# Patient Record
Sex: FEMALE | Race: WHITE | Employment: FULL TIME | URBAN - METROPOLITAN AREA
[De-identification: names, ages, dates, MRNs, and addresses within clinical notes are randomized per-mention and may not be internally consistent; named-entity substitution may affect disease eponyms.]

---

## 2018-05-07 ENCOUNTER — OFFICE VISIT (OUTPATIENT)
Dept: OBGYN CLINIC | Facility: CLINIC | Age: 28
End: 2018-05-07

## 2018-05-07 VITALS
WEIGHT: 155 LBS | HEART RATE: 62 BPM | SYSTOLIC BLOOD PRESSURE: 112 MMHG | TEMPERATURE: 98 F | BODY MASS INDEX: 25.83 KG/M2 | HEIGHT: 65 IN | DIASTOLIC BLOOD PRESSURE: 72 MMHG | RESPIRATION RATE: 12 BRPM

## 2018-05-07 DIAGNOSIS — Z30.431 SURVEILLANCE OF (INTRAUTERINE) CONTRACEPTIVE DEVICE: ICD-10-CM

## 2018-05-07 DIAGNOSIS — N60.02 BREAST CYST, LEFT: ICD-10-CM

## 2018-05-07 DIAGNOSIS — Z01.419 WELL WOMAN EXAM WITH ROUTINE GYNECOLOGICAL EXAM: Primary | ICD-10-CM

## 2018-05-07 DIAGNOSIS — N64.4 BREAST PAIN, LEFT: ICD-10-CM

## 2018-05-07 DIAGNOSIS — Z12.39 ENCOUNTER FOR SCREENING BREAST EXAMINATION: ICD-10-CM

## 2018-05-07 DIAGNOSIS — Z11.3 SCREEN FOR SEXUALLY TRANSMITTED DISEASES: ICD-10-CM

## 2018-05-07 DIAGNOSIS — Z12.4 SCREENING FOR MALIGNANT NEOPLASM OF CERVIX: ICD-10-CM

## 2018-05-07 PROCEDURE — 87591 N.GONORRHOEAE DNA AMP PROB: CPT | Performed by: NURSE PRACTITIONER

## 2018-05-07 PROCEDURE — 87625 HPV TYPES 16 & 18 ONLY: CPT | Performed by: NURSE PRACTITIONER

## 2018-05-07 PROCEDURE — 99385 PREV VISIT NEW AGE 18-39: CPT | Performed by: NURSE PRACTITIONER

## 2018-05-07 PROCEDURE — 87491 CHLMYD TRACH DNA AMP PROBE: CPT | Performed by: NURSE PRACTITIONER

## 2018-05-07 PROCEDURE — 87624 HPV HI-RISK TYP POOLED RSLT: CPT | Performed by: NURSE PRACTITIONER

## 2018-05-07 NOTE — PROGRESS NOTES
HPI:   Josiane Walker is a 32year old  who presents for an annual gynecological exam.   Has had Amy in place for past 2 years. Has no bleeding or issues with IUD. Denies vaginal concerns. Has pain in left breast for past 1-2 weeks.  Pt can feel GENERAL:  Well-appearing, no apparent distress, well nourished, well developed  SKIN: Normal, no rashes, no acne, no hirsutism, no ulcers  HEENT: Atraumatic, normocephalic  NECK: No masses, symmetric  THYROID: No masses, no thyromegaly  BREASTS: Normal i encouraged pt to have yearly annual examinations with her PCP for management of general medical problems. Pt voiced understanding of all instructions; all questions answered; no barriers to learning.       ASSESSMENT AND PLAN:   Ezra Pérez is a 32

## 2018-05-07 NOTE — PROGRESS NOTES
Patient felt a lump on her left breast last Thursday. Never had a mammo.  Last pap 2 years ago- normal.  Patient states she has been tired for approx 2-3 weeks

## 2018-05-14 ENCOUNTER — HOSPITAL ENCOUNTER (OUTPATIENT)
Dept: MAMMOGRAPHY | Age: 28
Discharge: HOME | End: 2018-05-14
Attending: NURSE PRACTITIONER
Payer: COMMERCIAL

## 2018-05-14 ENCOUNTER — HOSPITAL ENCOUNTER (OUTPATIENT)
Dept: ULTRASOUND IMAGING | Age: 28
Discharge: HOME | End: 2018-05-14
Attending: NURSE PRACTITIONER
Payer: COMMERCIAL

## 2018-05-14 DIAGNOSIS — N60.02 BREAST CYST, LEFT: ICD-10-CM

## 2018-05-14 DIAGNOSIS — N64.4 BREAST PAIN, LEFT: ICD-10-CM

## 2018-05-14 PROCEDURE — 76642 ULTRASOUND BREAST LIMITED: CPT | Performed by: NURSE PRACTITIONER

## 2018-06-08 ENCOUNTER — OFFICE VISIT (OUTPATIENT)
Dept: OBGYN CLINIC | Facility: CLINIC | Age: 28
End: 2018-06-08

## 2018-06-08 VITALS
DIASTOLIC BLOOD PRESSURE: 70 MMHG | SYSTOLIC BLOOD PRESSURE: 110 MMHG | RESPIRATION RATE: 12 BRPM | TEMPERATURE: 98 F | HEIGHT: 65 IN | WEIGHT: 154 LBS | BODY MASS INDEX: 25.66 KG/M2 | HEART RATE: 68 BPM

## 2018-06-08 DIAGNOSIS — R87.810 CERVICAL HIGH RISK HUMAN PAPILLOMAVIRUS (HPV) DNA TEST POSITIVE: Primary | ICD-10-CM

## 2018-06-08 PROCEDURE — 88305 TISSUE EXAM BY PATHOLOGIST: CPT | Performed by: OBSTETRICS & GYNECOLOGY

## 2018-06-08 PROCEDURE — 57454 BX/CURETT OF CERVIX W/SCOPE: CPT | Performed by: OBSTETRICS & GYNECOLOGY

## 2018-06-08 NOTE — PROGRESS NOTES
COLPOSCOPY:     25year old woman, , with an LMP of No LMP recorded (approximate). Patient is not currently having periods (Reason: IUD - Intrauterine Device). presents for colposcopy. The patient is not pregnant.  nat in with string visible  The discussed, as well as dysplasia. Patient was counseled on abstention from tobacco use. The need for close follow-up to help reduce the progression to cervical cancer was stressed to the patient.       Discussed that a brown, yellow or light red discharge

## 2018-06-19 ENCOUNTER — TELEPHONE (OUTPATIENT)
Dept: OBGYN CLINIC | Facility: CLINIC | Age: 28
End: 2018-06-19

## 2018-06-19 NOTE — TELEPHONE ENCOUNTER
Pt informed of results. Told dr Lauern Covington is out of the office, but we will call with her recommendations (re-pap in 6 vs 12 months) tomorrow after she reviews the results. Pt jacoby.

## 2019-05-29 ENCOUNTER — TELEPHONE (OUTPATIENT)
Dept: OBGYN CLINIC | Facility: CLINIC | Age: 29
End: 2019-05-29

## 2019-05-29 NOTE — TELEPHONE ENCOUNTER
PC with patient. Recommended taking Ibuprofen 400 mg prior to coming in for IUD appointment. Understands.

## 2019-06-18 ENCOUNTER — OFFICE VISIT (OUTPATIENT)
Dept: OBGYN CLINIC | Facility: CLINIC | Age: 29
End: 2019-06-18
Payer: COMMERCIAL

## 2019-06-18 ENCOUNTER — TELEPHONE (OUTPATIENT)
Dept: OBGYN CLINIC | Facility: CLINIC | Age: 29
End: 2019-06-18

## 2019-06-18 VITALS
BODY MASS INDEX: 24.66 KG/M2 | SYSTOLIC BLOOD PRESSURE: 118 MMHG | DIASTOLIC BLOOD PRESSURE: 72 MMHG | WEIGHT: 148 LBS | HEIGHT: 65 IN

## 2019-06-18 DIAGNOSIS — Z30.433 ENCOUNTER FOR REMOVAL AND REINSERTION OF INTRAUTERINE CONTRACEPTIVE DEVICE: Primary | ICD-10-CM

## 2019-06-18 PROCEDURE — 58301 REMOVE INTRAUTERINE DEVICE: CPT | Performed by: NURSE PRACTITIONER

## 2019-06-18 PROCEDURE — 81025 URINE PREGNANCY TEST: CPT | Performed by: NURSE PRACTITIONER

## 2019-06-18 PROCEDURE — 58300 INSERT INTRAUTERINE DEVICE: CPT | Performed by: NURSE PRACTITIONER

## 2019-06-18 RX ORDER — MULTIVITAMIN
TABLET ORAL
COMMUNITY

## 2019-06-18 NOTE — PROGRESS NOTES
Patient is here for placement of removal of Amy IUD and placement of [address] IUD. I discussed with the patient risks, benefits, and alternatives of IUD placement.   We discussed risk of failure rate and pregnancy, including ectopic pregnancy, pelvic in to protocol. IUD string cut to 3 cm from ext os. Pt tolerated procedure well. Lot #[id number] exp 01/2021    Assessment and Plan:    IUD placement:  The patient is instructed to RTC in 4 weeks for IUD check. Reviewed expectations with patient.  To call the

## 2020-10-28 ENCOUNTER — TELEPHONE (OUTPATIENT)
Dept: SURGERY | Facility: CLINIC | Age: 30
End: 2020-10-28

## 2020-10-28 NOTE — TELEPHONE ENCOUNTER
Calling alternative number- pt's boyfriend to try to reach the pt to set up an appt for Friday. Informed him that I have not been able to reach her via phone, and if he is able to reach her sooner, then let her know I have LVM and to have her call back.

## 2020-10-28 NOTE — TELEPHONE ENCOUNTER
Attempted to call pt back regarding an appt with Dr. Lalo Flores. No answer. LVM after verifying verbal release. Informed pt that I have received her records and we can see her on Friday @ 1:30.   Provided office number to call back or with additional question

## 2020-10-30 ENCOUNTER — OFFICE VISIT (OUTPATIENT)
Dept: SURGERY | Facility: CLINIC | Age: 30
End: 2020-10-30
Payer: COMMERCIAL

## 2020-10-30 VITALS
DIASTOLIC BLOOD PRESSURE: 84 MMHG | BODY MASS INDEX: 23.32 KG/M2 | HEART RATE: 86 BPM | OXYGEN SATURATION: 99 % | RESPIRATION RATE: 16 BRPM | SYSTOLIC BLOOD PRESSURE: 124 MMHG | HEIGHT: 65 IN | WEIGHT: 140 LBS

## 2020-10-30 DIAGNOSIS — N60.81 SEBACEOUS CYST OF SKIN OF RIGHT BREAST: Primary | ICD-10-CM

## 2020-10-30 DIAGNOSIS — R59.0 AXILLARY ADENOPATHY: ICD-10-CM

## 2020-10-30 PROCEDURE — 99244 OFF/OP CNSLTJ NEW/EST MOD 40: CPT | Performed by: SURGERY

## 2020-10-30 PROCEDURE — 76642 ULTRASOUND BREAST LIMITED: CPT | Performed by: SURGERY

## 2020-10-30 PROCEDURE — 99072 ADDL SUPL MATRL&STAF TM PHE: CPT | Performed by: SURGERY

## 2020-10-30 PROCEDURE — 3074F SYST BP LT 130 MM HG: CPT | Performed by: SURGERY

## 2020-10-30 PROCEDURE — 3079F DIAST BP 80-89 MM HG: CPT | Performed by: SURGERY

## 2020-10-30 PROCEDURE — 3008F BODY MASS INDEX DOCD: CPT | Performed by: SURGERY

## 2020-11-03 NOTE — PROGRESS NOTES
Breast Surgery New Patient Consultation    This is the first visit for this 26year old woman, referred by Dr. Judy Ness, who presents for evaluation of right breast infection.     History of Present Illness:   Ms. Claudette Fisher is a 26year old woman who pr Relation Age of Onset   • Cancer Father 61        skin   • Colon Cancer Maternal Uncle 72       She is not of Ashkenazi [place] ancestry.     Social History:    Alcohol use Yes 7.0 standard drinks/week 7 Glasses of wine per week         Smoking status: Never lesions, dry skin, change in skin color or change in moles. Hematologic/Lymphatic:  The patient denies easily bruising or bleeding or persistent swollen glands or lymph nodes.      Musculoskeletal:  The patient denies muscle aches/pain, joint pain, stif breast with no surrounding cellulitis, underlying fluctuance or other concerns. There is no skin dimpling with movement of the pectoralis. There is no nipple retraction. No nipple discharge can be elicited. The parenchyma is mildly nodular.  There are no d ultrasound for the palpable left axillary lymph node.     Targeted left axillary lymph node   Focusing revealed left axilla at the area of palpable concern demonstrated a normal-appearing lymph node that was enlarged with a maximal dimension of 1.8 x 0.9 cm

## 2020-12-01 ENCOUNTER — TELEPHONE (OUTPATIENT)
Dept: SURGERY | Facility: CLINIC | Age: 30
End: 2020-12-01

## 2020-12-01 DIAGNOSIS — N60.81 SEBACEOUS CYST OF SKIN OF RIGHT BREAST: Primary | ICD-10-CM

## 2020-12-01 RX ORDER — LEVOFLOXACIN 750 MG/1
750 TABLET ORAL DAILY
Qty: 7 TABLET | Refills: 0 | Status: SHIPPED | OUTPATIENT
Start: 2020-12-01 | End: 2020-12-08 | Stop reason: ALTCHOICE

## 2020-12-01 NOTE — TELEPHONE ENCOUNTER
----- Message from Leslee Tolbert sent at 11/27/2020  9:44 PM CST -----  Regarding: Visit Follow-up Question  Contact: [phone]  Julio César - I saw Dr Norma Gee a few weeks back because my primary doctor wanted me to follow up with a hard bump on my breast.

## 2020-12-01 NOTE — TELEPHONE ENCOUNTER
Returned pt phone call regarding a breast lump that has returned. Pt states it is the same thing that she saw Dr. Greta Martínez for last time. Pt reports that it is on \"the right outer, lower portion where the bra strap would hit. ... Radha Maldonado and it looks like a pimple

## 2020-12-01 NOTE — TELEPHONE ENCOUNTER
Returned pt phone call regarding \"breast lump that has returned\"  Patient denies fevers, warmth, or drainage.    Patient reports \"it feels like it did 3 days prior to last time when it was really painful\"  Informed pt that I would be sending in an antib

## 2020-12-08 ENCOUNTER — OFFICE VISIT (OUTPATIENT)
Dept: SURGERY | Facility: CLINIC | Age: 30
End: 2020-12-08
Payer: COMMERCIAL

## 2020-12-08 VITALS
SYSTOLIC BLOOD PRESSURE: 101 MMHG | RESPIRATION RATE: 16 BRPM | OXYGEN SATURATION: 98 % | DIASTOLIC BLOOD PRESSURE: 51 MMHG | HEART RATE: 79 BPM

## 2020-12-08 DIAGNOSIS — N60.81 SEBACEOUS CYST OF SKIN OF RIGHT BREAST: Primary | ICD-10-CM

## 2020-12-08 PROCEDURE — 3078F DIAST BP <80 MM HG: CPT | Performed by: SURGERY

## 2020-12-08 PROCEDURE — 3074F SYST BP LT 130 MM HG: CPT | Performed by: SURGERY

## 2020-12-08 PROCEDURE — 99214 OFFICE O/P EST MOD 30 MIN: CPT | Performed by: SURGERY

## 2020-12-08 RX ORDER — DICYCLOMINE HYDROCHLORIDE 10 MG/1
CAPSULE ORAL
COMMUNITY
Start: 2020-12-04

## 2020-12-08 NOTE — PROGRESS NOTES
Breast Surgery Surveillance Visit    History of Present Illness:   Ms. Khadra Cordova is a 26year old woman who presents with history of an abscess of the right breast that developed in October 2020.   She does report that she has had a history of swelli of Onset   • Cancer Father 61        skin   • Colon Cancer Maternal Uncle 72       She is not of Ashkenazi [place] ancestry.     Social History:    Alcohol use Yes 7.0 standard drinks/week 7 Glasses of wine per week         Smoking status: Never Smoker   Smo skin, change in skin color or change in moles. Hematologic/Lymphatic:  The patient denies easily bruising or bleeding or persistent swollen glands or lymph nodes.      Musculoskeletal:  The patient denies muscle aches/pain, joint pain, stiff joints, [place] dimpling with movement of the pectoralis. There is no nipple retraction. No nipple discharge can be elicited. The parenchyma is mildly nodular.  There are no dominant masses in the breast. The axillary tail is normal.  Left breast:   The skin, nipple, and a to persist or if the lymph node swelling does not improve that she will need a further work-up with a formal diagnostic mammogram and ultrasound. I have recommended she return to see me should any recurrence of symptoms of the cyst have been.   I also advi

## 2020-12-10 ENCOUNTER — HOSPITAL ENCOUNTER (OUTPATIENT)
Dept: NUCLEAR MEDICINE | Facility: HOSPITAL | Age: 30
Discharge: HOME | End: 2020-12-10
Attending: FAMILY MEDICINE
Payer: COMMERCIAL

## 2020-12-10 DIAGNOSIS — R10.11 RUQ PAIN: ICD-10-CM

## 2020-12-10 PROCEDURE — 78227 HEPATOBIL SYST IMAGE W/DRUG: CPT | Performed by: FAMILY MEDICINE

## 2021-01-27 ENCOUNTER — TELEPHONE (OUTPATIENT)
Dept: OBGYN CLINIC | Facility: CLINIC | Age: 31
End: 2021-01-27

## 2021-01-27 NOTE — TELEPHONE ENCOUNTER
----- Message from Noemy Jayro sent at 1/26/2021  9:43 AM CST -----  Regarding: Non-Urgent Medical Question  Contact: [phone]  Hello,    I have had lower abdominal pain which my primary care doctor suspected was related to my gallbladder.  Claudell Ash

## 2021-01-28 NOTE — TELEPHONE ENCOUNTER
Has been having pain for past few years in [place]. Has seen GI and had testing done and unable to figure out. Comes in waves ad goes away quickly. Past few months occurring more often. States has not had a pelvic ultrasound. Has IUD in. Able to feel string. Kimberly

## (undated) NOTE — LETTER
Audra Phillips 19    1. I authorize the performance upon Brooklyn Dyer  the following: Intrauterine Device Removal    2.  I authorize PRASAD Coffman (and whomever is designated as the doctor’s a Relationship to patient: ____________________________________________    Witness: _________________________________________ Date:___________     Physician Signature: _______________________________ Date:___________

## (undated) NOTE — LETTER
Audra Wharton 19    1. I authorize the performance upon Dariusz Christensen  the following: Insertion Intrauterine Device    2.  I authorize PRASAD Narvaez (and whomever is designated as the doctor’s Relationship to patient: ____________________________________________    Witness: _________________________________________ Date:___________     Physician Signature: _______________________________ Date:___________

## (undated) NOTE — LETTER
[facility] Department of OB/GYN  After Care Instructions for Colposcopy/Biopsy      Biopsy Results   You will receive a phone call with your biopsy results in 7 business days. If you have not received your biopsy results in 7 days, please contact our office.   Ehsan Pereyra

## (undated) NOTE — LETTER
[facility]    1. I authorize the performance upon Ray Buchanan  the following: Colposcopy with biopsy and Endocervical curettage    2.  I authorize Dr. Mony Dunn MD (and whomever is designated a Relationship to patient: ____________________________________________    Witness: _________________________________________ Date:___________     Physician Signature: _______________________________ Date:___________